# Patient Record
Sex: FEMALE | Race: OTHER
[De-identification: names, ages, dates, MRNs, and addresses within clinical notes are randomized per-mention and may not be internally consistent; named-entity substitution may affect disease eponyms.]

---

## 2023-05-24 ENCOUNTER — HOSPITAL ENCOUNTER (EMERGENCY)
Dept: HOSPITAL 12 - ER | Age: 53
Discharge: HOME | End: 2023-05-24
Payer: COMMERCIAL

## 2023-05-24 VITALS — WEIGHT: 200 LBS | BODY MASS INDEX: 35.44 KG/M2 | HEIGHT: 63 IN

## 2023-05-24 DIAGNOSIS — R07.89: Primary | ICD-10-CM

## 2023-05-24 DIAGNOSIS — I10: ICD-10-CM

## 2023-05-24 DIAGNOSIS — K80.20: ICD-10-CM

## 2023-05-24 LAB
ALP SERPL-CCNC: 136 U/L (ref 50–136)
ALT SERPL W/O P-5'-P-CCNC: 50 U/L (ref 14–59)
AST SERPL-CCNC: 21 U/L (ref 15–37)
BILIRUB DIRECT SERPL-MCNC: 0.1 MG/DL (ref 0–0.2)
BILIRUB SERPL-MCNC: 0.2 MG/DL (ref 0.2–1)
BUN SERPL-MCNC: 15 MG/DL (ref 7–18)
CHLORIDE SERPL-SCNC: 104 MMOL/L (ref 98–107)
CO2 SERPL-SCNC: 30 MMOL/L (ref 21–32)
CREAT SERPL-MCNC: 0.7 MG/DL (ref 0.6–1.3)
GLUCOSE SERPL-MCNC: 99 MG/DL (ref 74–106)
HCT VFR BLD AUTO: 37.8 % (ref 31.2–41.9)
MCH RBC QN AUTO: 29.2 UUG (ref 24.7–32.8)
MCV RBC AUTO: 89.4 FL (ref 75.5–95.3)
PLATELET # BLD AUTO: 252 K/UL (ref 179–408)
POTASSIUM SERPL-SCNC: 3.4 MMOL/L (ref 3.5–5.1)
WS STN SPEC: 7.2 G/DL (ref 6.4–8.2)

## 2023-05-24 PROCEDURE — A4663 DIALYSIS BLOOD PRESSURE CUFF: HCPCS

## 2023-05-24 PROCEDURE — A9150 MISC/EXPER NON-PRESCRIPT DRU: HCPCS

## 2023-05-24 NOTE — NUR
PT A,A AND O X 4 WITH VSS, NO CP, NO SOB AND NAD OBSERVED.Patient discharged to 
home in stable condition.  Written and verbal after care instructions given. 

Patient verbalizes understanding of instructions. Stressed follow up or return 
to ER for worsening s/s. PT AMB OUT WITH STEADY GAIT WITH FAMILY.

## 2023-05-25 VITALS — DIASTOLIC BLOOD PRESSURE: 85 MMHG | SYSTOLIC BLOOD PRESSURE: 136 MMHG
